# Patient Record
Sex: MALE | Race: BLACK OR AFRICAN AMERICAN | NOT HISPANIC OR LATINO | Employment: OTHER | URBAN - METROPOLITAN AREA
[De-identification: names, ages, dates, MRNs, and addresses within clinical notes are randomized per-mention and may not be internally consistent; named-entity substitution may affect disease eponyms.]

---

## 2017-03-28 ENCOUNTER — OFFICE VISIT (OUTPATIENT)
Dept: NEUROLOGY | Facility: CLINIC | Age: 52
End: 2017-03-28

## 2017-03-28 VITALS
BODY MASS INDEX: 34.21 KG/M2 | HEIGHT: 67 IN | SYSTOLIC BLOOD PRESSURE: 149 MMHG | HEART RATE: 82 BPM | DIASTOLIC BLOOD PRESSURE: 86 MMHG | WEIGHT: 218 LBS

## 2017-03-28 DIAGNOSIS — G40.019 LOCALIZATION-RELATED EPILEPSY, INTRACTABLE (H): Primary | ICD-10-CM

## 2017-03-28 LAB
AST SERPL W P-5'-P-CCNC: 6 U/L (ref 0–45)
PHENYTOIN SERPL-MCNC: 16.9 MG/L (ref 10–20)
SODIUM SERPL-SCNC: 144 MMOL/L (ref 133–144)

## 2017-03-28 RX ORDER — CARBAMAZEPINE 200 MG/1
TABLET, EXTENDED RELEASE ORAL
Qty: 150 TABLET | Refills: 9 | Status: SHIPPED | OUTPATIENT
Start: 2017-03-28 | End: 2017-05-01

## 2017-03-28 RX ORDER — PHENYTOIN SODIUM 100 MG/1
CAPSULE, EXTENDED RELEASE ORAL
Qty: 120 CAPSULE | Refills: 9 | Status: SHIPPED | OUTPATIENT
Start: 2017-03-28

## 2017-03-28 RX ORDER — EXTENDED PHENYTOIN SODIUM 30 MG/1
CAPSULE ORAL
Qty: 30 CAPSULE | Refills: 9 | Status: SHIPPED | OUTPATIENT
Start: 2017-03-28

## 2017-03-28 NOTE — LETTER
3/28/2017     RE: Hernandez Melara  : 1965   MRN: [id number]      Dear Colleague,    Thank you for referring your patient, Hernandez Melara, to the [facility] EPILEPSY CARE at [facility]. Please see a copy of my visit note below.    [facility] [place] [facility]      Patient:  Hernandez Melara  :  1965   Age:  51 year old   Today's Office Visit:  3/28/2017    Epilepsy Data:    Patient History: Hernandez had a severe illness at age 2.5 which put him in a coma. Subsequently he developed mild left hemiparesis, left hemiatrophy and seizures. Clinical history is most suggestive of Localization-related epilepsy with complex partial seizures. An MRI with epilepsy protocol and a 3-hour EEG recommended but patient refuses further testing.    Primary Epileptologist/Provider: Bonnie BALDERRAMA  Epilepsy Syndrome: Localization-related epilepsy unspecified  Age of Onset: 2  Other Relevant Dx/ Issues: He is a twin, severe illness with coma age 1, with subsequent left-sided hemiparesis, severe hypoglycemia age 2, febrile szs ages 3-5     Tests/Surgery History  Last EEG:  (Patient refuse to do EEG)  Last MRI:  (Patient refuse to have MRI)    Seizure Record  Current Visit Date: 17  Previous Visit Date: 16  Months since last visit: 10.12  Seizure Type 1: Complex partial seizures unspecified  # of Type 1 Seizure since last visit: 0  Freq. Type 1 / Month: 0    History of Present Illness:   No seizures this interval. Last seizure was 2016. No concerns today.       Current Outpatient Prescriptions   Medication Sig Dispense Refill     carBAMazepine (TEGRETOL XR) 200 MG 12 hr tablet TAKE 2 TABLETS BY MOUTH EVERY MORNING AND 3 TABLETS EVERY EVENING 150 tablet 9     DILANTIN 100 MG ER capsule Brand: 2 cap po bid (together with 30 mg cap) 120 capsule 9     DILANTIN 30 MG ER capsule Brand. TAKE 1 CAPSULE BY MOUTH EVERY NIGHT AT BEDTIME WITH  MG CAPSULES 30 capsule 9      "rosuvastatin (CRESTOR) 10 MG tablet Take by mouth daily       Calcium Carbonate-Vitamin D (CALCIUM + D PO) Take  by mouth.       Simvastatin (ZOCOR PO) Take  by mouth daily.       Multiple Vitamin (MULTIVITAMINS PO) Take  by mouth daily.       [DISCONTINUED] Phenytoin (DILANTIN PO) Take 30 mg by mouth. 1 tab in the pm          Medication Notes:     Ref. Range 5/24/2016 11:00   Sodium Latest Ref Range: 133 - 144 mmol/L 142      Ref. Range 5/24/2016 11:00   Carbamazepine Total Level Unknown 5.8   10, 11 Epoxide Level Unknown 1.4   Phenytoin Level Latest Ref Range: 10 - 20 mg/L 20.1 (H)   Phenytoin Free Unknown 1.48     AED Side Effects Discussed: Yes   AED Medication Compliance:  compliant most of the time    Review of Systems:  Lethargy / Tiredness: No  Nausea / Vomiting: No  Double Vision: No  Sleepiness: No  Depression: No  Poor Balance: baseline  Dizziness: No  Appetite Changes: No  Blurred Vision: No  Sleep Changes: No  Behavioral Changes: No    Have you experienced a traumatic fall since your last visit: NO  Are these falls related to your seizures: Not Applicable    Other Issues:    Not working, on disability     Exam:    /86  Pulse 82  Ht 5' 7.01\" (170.2 cm)  Wt 218 lb (98.9 kg)  BMI 34.14 kg/m2     Wt Readings from Last 5 Encounters:   03/28/17 218 lb (98.9 kg)   05/24/16 206 lb 3.2 oz (93.5 kg)   07/28/15 214 lb 3.2 oz (97.2 kg)   10/27/14 221 lb (100.2 kg)   01/09/14 227 lb (103 kg)     General Appearance: alert/awake, NAD, weight gain of 12 lbs   Gait: Mild left hemiparetic gait, unable to do tandem gait  Language/speech: no aphasia or dysarthria  Extraocular Movements: Intact, no nystagmus  Coordination: normal FNF on the right. On the left is clumsy due to hemiparesis  Facial Strength: Face is symmetric  Tongue Strength: tongue is midline  Limb Strength: 5/5 on the right. 4+/5 on the left upper and lower extremities. Left hemiatrophy   Tone: increased in left upper and lower " extremities.    Assessment and Plan:   1. Symptomatic localization-related epilepsy (based on clinical history of left hemiparesis, which suggests right hemispheric insult). The patient has refused EEG and MRI in past. Well controlled on current doses.     Patient Instructions   Continue antiepileptic drugs as before  Call if seizures or concerns  Follow-up in 9 months     As described above, I met with the patient for 15 minutes and during this time counseling was less than 50% of the visit time.    Again, thank you for allowing me to participate in the care of your patient.      Sincerely,  TODD Mar

## 2017-03-28 NOTE — PROGRESS NOTES
[facility] [place] [facility]      Patient:  Hernandez Melara  :  1965   Age:  51 year old   Today's Office Visit:  3/28/2017    Epilepsy Data:    Patient History: Hernandez had a severe illness at age 2.5 which put him in a coma. Subsequently he developed mild left hemiparesis, left hemiatrophy and seizures. Clinical history is most suggestive of Localization-related epilepsy with complex partial seizures. An MRI with epilepsy protocol and a 3-hour EEG recommended but patient refuses further testing.    Primary Epileptologist/Provider: Bonnie BALDERRAMA  Epilepsy Syndrome: Localization-related epilepsy unspecified  Age of Onset: 2  Other Relevant Dx/ Issues: He is a twin, severe illness with coma age 1, with subsequent left-sided hemiparesis, severe hypoglycemia age 2, febrile szs ages 3-5     Tests/Surgery History  Last EEG:  (Patient refuse to do EEG)  Last MRI:  (Patient refuse to have MRI)    Seizure Record  Current Visit Date: 17  Previous Visit Date: 16  Months since last visit: 10.12  Seizure Type 1: Complex partial seizures unspecified  # of Type 1 Seizure since last visit: 0  Freq. Type 1 / Month: 0    History of Present Illness:   No seizures this interval. Last seizure was 2016. No concerns today.       Current Outpatient Prescriptions   Medication Sig Dispense Refill     carBAMazepine (TEGRETOL XR) 200 MG 12 hr tablet TAKE 2 TABLETS BY MOUTH EVERY MORNING AND 3 TABLETS EVERY EVENING 150 tablet 9     DILANTIN 100 MG ER capsule Brand: 2 cap po bid (together with 30 mg cap) 120 capsule 9     DILANTIN 30 MG ER capsule Brand. TAKE 1 CAPSULE BY MOUTH EVERY NIGHT AT BEDTIME WITH  MG CAPSULES 30 capsule 9     rosuvastatin (CRESTOR) 10 MG tablet Take by mouth daily       Calcium Carbonate-Vitamin D (CALCIUM + D PO) Take  by mouth.       Simvastatin (ZOCOR PO) Take  by mouth daily.       Multiple Vitamin (MULTIVITAMINS PO) Take  by mouth daily.       [DISCONTINUED] Phenytoin  "(DILANTIN PO) Take 30 mg by mouth. 1 tab in the pm          Medication Notes:     Ref. Range 5/24/2016 11:00   Sodium Latest Ref Range: 133 - 144 mmol/L 142      Ref. Range 5/24/2016 11:00   Carbamazepine Total Level Unknown 5.8   10, 11 Epoxide Level Unknown 1.4   Phenytoin Level Latest Ref Range: 10 - 20 mg/L 20.1 (H)   Phenytoin Free Unknown 1.48     AED Side Effects Discussed: Yes   AED Medication Compliance:  compliant most of the time    Review of Systems:  Lethargy / Tiredness: No  Nausea / Vomiting: No  Double Vision: No  Sleepiness: No  Depression: No  Poor Balance: baseline  Dizziness: No  Appetite Changes: No  Blurred Vision: No  Sleep Changes: No  Behavioral Changes: No    Have you experienced a traumatic fall since your last visit: NO  Are these falls related to your seizures: Not Applicable    Other Issues:    Not working, on disability     Exam:    /86  Pulse 82  Ht 5' 7.01\" (170.2 cm)  Wt 218 lb (98.9 kg)  BMI 34.14 kg/m2     Wt Readings from Last 5 Encounters:   03/28/17 218 lb (98.9 kg)   05/24/16 206 lb 3.2 oz (93.5 kg)   07/28/15 214 lb 3.2 oz (97.2 kg)   10/27/14 221 lb (100.2 kg)   01/09/14 227 lb (103 kg)     General Appearance: alert/awake, NAD, weight gain of 12 lbs   Gait: Mild left hemiparetic gait, unable to do tandem gait  Language/speech: no aphasia or dysarthria  Extraocular Movements: Intact, no nystagmus  Coordination: normal FNF on the right. On the left is clumsy due to hemiparesis  Facial Strength: Face is symmetric  Tongue Strength: tongue is midline  Limb Strength: 5/5 on the right. 4+/5 on the left upper and lower extremities. Left hemiatrophy   Tone: increased in left upper and lower extremities.    Assessment and Plan:   1. Symptomatic localization-related epilepsy (based on clinical history of left hemiparesis, which suggests right hemispheric insult). The patient has refused EEG and MRI in past. Well controlled on current doses.     Patient Instructions   Continue " antiepileptic drugs as before  Call if seizures or concerns  Follow-up in 9 months     As described above, I met with the patient for 15 minutes and during this time counseling was less than 50% of the visit time.

## 2017-03-28 NOTE — MR AVS SNAPSHOT
After Visit Summary   3/28/2017    Hernandez Melara    MRN: [id number]           Patient Information     Date Of Birth          1965        Visit Information        Provider Department      3/28/2017 11:30 AM Bonnie Epstein PA [facility]        Today's Diagnoses     Localization-related epilepsy, intractable (H)    -  1      Care Instructions    Continue antiepileptic drugs as before  Call if seizures or concerns  Follow-up in 9 months         Follow-ups after your visit        Follow-up notes from your care team     Return in about 9 months (around 2017).      Who to contact     Please call your clinic at [phone] to:    Ask questions about your health    Make or cancel appointments    Discuss your medicines    Learn about your test results    Speak to your doctor   If you have compliments or concerns about an experience at your clinic, or if you wish to file a complaint, please contact [facility] Patient Relations at [phone] or email us at [email or web address]         Additional Information About Your Visit        MyChart Information     [employer] is an electronic gateway that provides easy, online access to your medical records. With [employer], you can request a clinic appointment, read your test results, renew a prescription or communicate with your care team.     To sign up for [employer] visit the website at [email or web address]   You will be asked to enter the access code listed below, as well as some personal information. Please follow the directions to create your username and password.     Your access code is: [email or web address]  Expires: 2017  1:53 PM     Your access code will  in 90 days. If you need help or a new code, please contact your [facility] or call [phone] for assistance.        Care EveryWhere ID     This is your Care EveryWhere ID. This could be used by other  "organizations to access your [facility] medical records  [id number]        Your Vitals Were     Pulse Height BMI (Body Mass Index)             82 5' 7.01\" (170.2 cm) 34.14 kg/m2          Blood Pressure from Last 3 Encounters:   03/28/17 149/86   05/24/16 144/78   07/28/15 145/79    Weight from Last 3 Encounters:   03/28/17 218 lb (98.9 kg)   05/24/16 206 lb 3.2 oz (93.5 kg)   07/28/15 214 lb 3.2 oz (97.2 kg)              We Performed the Following     AST (SGOT)     Carbamazepine and 10 11 epoxide     Phenytoin free     Phenytoin level     Sodium          Where to get your medicines      These medications were sent to [facility] [address] [address] [address]     Phone:  [phone]     carBAMazepine 200 MG 12 hr tablet    DILANTIN 100 MG CR capsule    DILANTIN 30 MG CR capsule          Primary Care Provider Office Phone # Fax #    Scooter Perkins [phone]       [facility] [address] [facility] [address]        Thank you!     Thank you for choosing [facility] EPILEPSY CARE  for your care. Our goal is always to provide you with excellent care. Hearing back from our patients is one way we can continue to improve our services. Please take a few minutes to complete the written survey that you may receive in the mail after your visit with us. Thank you!             Your Updated Medication List - Protect others around you: Learn how to safely use, store and throw away your medicines at www.disposemymeds.org.          This list is accurate as of: 3/28/17  3:47 PM.  Always use your most recent med list.                   Brand Name Dispense Instructions for use    CALCIUM + D PO      Take  by mouth.       carBAMazepine 200 MG 12 hr tablet    TEGretol XR    150 tablet    TAKE 2 TABLETS BY MOUTH EVERY MORNING AND 3 TABLETS EVERY EVENING       CRESTOR 10 MG tablet   Generic drug:  rosuvastatin      Take by mouth " daily       * DILANTIN 100 MG CR capsule   Generic drug:  phenytoin     120 capsule    Brand: 2 cap po bid (together with 30 mg cap)       * DILANTIN 30 MG CR capsule   Generic drug:  phenytoin     30 capsule    Brand. TAKE 1 CAPSULE BY MOUTH EVERY NIGHT AT BEDTIME WITH  MG CAPSULES       MULTIVITAMINS PO      Take  by mouth daily.       ZOCOR PO      Take  by mouth daily.       * Notice:  This list has 2 medication(s) that are the same as other medications prescribed for you. Read the directions carefully, and ask your doctor or other care provider to review them with you.

## 2017-03-28 NOTE — LETTER
Patient:  Hernandez Melara  :   1965  MRN:     [id number]        Mr.Rodney ETHAN Melara  [address]   [place] [facility] [place] [facility] [address]        2017    Dear ,    We are writing to inform you of your test results.    Your test results fall within the expected range(s) or remain unchanged from previous results.  Please continue with current treatment plan.    Resulted Orders   Carbamazepine and 10 11 epoxide   Result Value Ref Range    Carbamazepine Total Level 4.8       Comment:      Reference range: 4.0  to  12.0  Unit: ug/mL      10, 11 Epoxide Level 1.1       Comment:      Reference range: 0.4  to  4.0  Unit: ug/mL  (Note)  This test was developed and its performance characteristics  determined by [facility].  It has not been cleared or approved  by the Food and Drug Administration.  Analysis performed by [facility], [address] [place] [address]     Phenytoin level   Result Value Ref Range    Phenytoin Level 16.9 10 - 20 mg/L   Phenytoin free   Result Value Ref Range    Phenytoin Free 1.24       Comment:      Analysis performed by [facility], [address] [place] [address]  Reference range: 1.00  to  2.00  Unit: ug/ml     AST (SGOT)   Result Value Ref Range    AST 6 0 - 45 U/L   Sodium   Result Value Ref Range    Sodium 144 133 - 144 mmol/L       Bonnie Epstein PA-C              [phone]  1965

## 2017-03-30 LAB
CARBAMAZEPINE EP SERPL-MCNC: 1.1 UG/ML
CARBAMAZEPINE SERPL-MCNC: 4.8 UG/ML
PHENYTOIN FREE SERPL-MCNC: 1.24 UG/ML

## 2017-05-01 ENCOUNTER — TELEPHONE (OUTPATIENT)
Dept: NEUROLOGY | Facility: CLINIC | Age: 52
End: 2017-05-01

## 2017-05-01 DIAGNOSIS — G40.019 LOCALIZATION-RELATED EPILEPSY, INTRACTABLE (H): ICD-10-CM

## 2017-05-01 RX ORDER — CARBAMAZEPINE 200 MG/1
TABLET, EXTENDED RELEASE ORAL
Qty: 450 TABLET | Refills: 1 | Status: SHIPPED | OUTPATIENT
Start: 2017-05-01 | End: 2017-11-10

## 2017-11-10 DIAGNOSIS — G40.019 LOCALIZATION-RELATED EPILEPSY, INTRACTABLE (H): ICD-10-CM

## 2017-11-10 RX ORDER — CARBAMAZEPINE 200 MG/1
TABLET, EXTENDED RELEASE ORAL
Qty: 450 TABLET | Refills: 0 | Status: SHIPPED | OUTPATIENT
Start: 2017-11-10

## 2018-02-06 DIAGNOSIS — G40.019 LOCALIZATION-RELATED EPILEPSY, INTRACTABLE (H): ICD-10-CM

## 2018-02-06 RX ORDER — CARBAMAZEPINE 200 MG/1
TABLET, EXTENDED RELEASE ORAL
Qty: 450 TABLET | Refills: 0 | OUTPATIENT
Start: 2018-02-06

## 2018-02-08 ENCOUNTER — TELEPHONE (OUTPATIENT)
Dept: NEUROLOGY | Facility: CLINIC | Age: 53
End: 2018-02-08

## 2018-02-08 DIAGNOSIS — G40.019 LOCALIZATION-RELATED EPILEPSY, INTRACTABLE (H): ICD-10-CM

## 2018-02-08 DIAGNOSIS — Z53.9 ERRONEOUS ENCOUNTER--DISREGARD: Primary | ICD-10-CM

## 2018-02-08 RX ORDER — PHENYTOIN SODIUM 100 MG/1
CAPSULE, EXTENDED RELEASE ORAL
Qty: 120 CAPSULE | Refills: 0 | OUTPATIENT
Start: 2018-02-08

## 2018-02-08 RX ORDER — CARBAMAZEPINE 200 MG/1
TABLET, EXTENDED RELEASE ORAL
Qty: 450 TABLET | Refills: 0 | OUTPATIENT
Start: 2018-02-08

## 2018-02-08 RX ORDER — EXTENDED PHENYTOIN SODIUM 30 MG/1
CAPSULE ORAL
Qty: 30 CAPSULE | Refills: 0 | OUTPATIENT
Start: 2018-02-08

## 2018-04-16 DIAGNOSIS — G40.019 LOCALIZATION-RELATED EPILEPSY, INTRACTABLE (H): Primary | ICD-10-CM

## 2018-04-16 RX ORDER — EXTENDED PHENYTOIN SODIUM 30 MG/1
CAPSULE ORAL
Qty: 14 CAPSULE | Refills: 0 | Status: SHIPPED | OUTPATIENT
Start: 2018-04-16

## 2018-04-16 RX ORDER — PHENYTOIN SODIUM 100 MG/1
CAPSULE, EXTENDED RELEASE ORAL
Qty: 56 CAPSULE | Refills: 0 | Status: SHIPPED | OUTPATIENT
Start: 2018-04-16 | End: 2019-09-03

## 2018-08-07 DIAGNOSIS — G40.019 LOCALIZATION-RELATED EPILEPSY, INTRACTABLE (H): ICD-10-CM

## 2018-08-07 RX ORDER — PHENYTOIN SODIUM 100 MG/1
CAPSULE, EXTENDED RELEASE ORAL
Qty: 56 CAPSULE | Refills: 0 | Status: CANCELLED | OUTPATIENT
Start: 2018-08-07

## 2018-08-09 ENCOUNTER — TELEPHONE (OUTPATIENT)
Dept: NEUROLOGY | Facility: CLINIC | Age: 53
End: 2018-08-09

## 2018-08-09 DIAGNOSIS — G40.019 LOCALIZATION-RELATED EPILEPSY, INTRACTABLE (H): ICD-10-CM

## 2018-08-09 RX ORDER — PHENYTOIN SODIUM 100 MG/1
CAPSULE, EXTENDED RELEASE ORAL
Qty: 56 CAPSULE | Refills: 0 | OUTPATIENT
Start: 2018-08-09

## 2018-08-09 NOTE — TELEPHONE ENCOUNTER
Nurse received refill request for: Dilantin 100 mg     Last re-ordered: 4/16/18    Last refill: 7/11/18    Last appointment: 3/28/17    Next appointment:  None scheduled    From last clinic notes:    Pharmacy: [facility] # [id number]    Action taken: Refused - needs to follow up

## 2019-01-18 DIAGNOSIS — G40.019 LOCALIZATION-RELATED EPILEPSY, INTRACTABLE (H): ICD-10-CM

## 2019-01-18 RX ORDER — PHENYTOIN SODIUM 100 MG/1
CAPSULE, EXTENDED RELEASE ORAL
Qty: 56 CAPSULE | Refills: 0 | OUTPATIENT
Start: 2019-01-18

## 2019-01-18 NOTE — TELEPHONE ENCOUNTER
Refill request received. Patient hasn't been to the clinic for >18 months. Charting reflects that the patient is no longer following with our office.

## 2019-09-03 DIAGNOSIS — G40.019 LOCALIZATION-RELATED EPILEPSY, INTRACTABLE (H): Primary | ICD-10-CM

## 2019-09-03 RX ORDER — PHENYTOIN SODIUM 100 MG/1
CAPSULE, EXTENDED RELEASE ORAL
Qty: 56 CAPSULE | Refills: 0 | Status: SHIPPED | OUTPATIENT
Start: 2019-09-03

## 2019-09-27 DIAGNOSIS — G40.019 LOCALIZATION-RELATED EPILEPSY, INTRACTABLE (H): ICD-10-CM

## 2019-09-30 RX ORDER — PHENYTOIN SODIUM 100 MG/1
CAPSULE, EXTENDED RELEASE ORAL
Qty: 56 CAPSULE | Refills: 0 | OUTPATIENT
Start: 2019-09-30

## 2019-09-30 NOTE — TELEPHONE ENCOUNTER
Patient has not been seen for >2 years.  Refill refused. Patient should follow with the provider who has been managing medications

## 2019-11-12 DIAGNOSIS — G40.019 LOCALIZATION-RELATED EPILEPSY, INTRACTABLE (H): ICD-10-CM

## 2019-11-12 RX ORDER — PHENYTOIN SODIUM 100 MG/1
CAPSULE, EXTENDED RELEASE ORAL
Qty: 56 CAPSULE | Refills: 0 | OUTPATIENT
Start: 2019-11-12

## 2019-12-23 DIAGNOSIS — G40.019 LOCALIZATION-RELATED EPILEPSY, INTRACTABLE (H): ICD-10-CM

## 2019-12-27 RX ORDER — PHENYTOIN SODIUM 100 MG/1
CAPSULE, EXTENDED RELEASE ORAL
Qty: 56 CAPSULE | Refills: 0 | OUTPATIENT
Start: 2019-12-27

## 2019-12-27 NOTE — TELEPHONE ENCOUNTER
Incoming refill request from Maribel Chu79 via Interface.    Medication requested: Dilantin 100 mg capsule  Directions: Take 2 capsules by mouth twice daily  Last refilled: 9/3/19    Last seen: 3/28/17  RTC: 9 months  Cancel: one visit  No-show: one visit  Next appt:  Not scheduled    Refill request refused as patient is no longer under the care of our office.

## 2021-05-02 NOTE — NURSING NOTE
Medication taken at: 9 am  Blood drawn at: 11:41 am  Marisa Singh CMA        
self inflicted burn to left wrist

## 2022-03-02 ENCOUNTER — HOSPITAL ENCOUNTER (EMERGENCY)
Facility: CLINIC | Age: 57
Discharge: HOME | End: 2022-03-02
Attending: EMERGENCY MEDICINE | Admitting: EMERGENCY MEDICINE
Payer: MEDICARE

## 2022-03-02 VITALS
BODY MASS INDEX: 29.91 KG/M2 | RESPIRATION RATE: 18 BRPM | SYSTOLIC BLOOD PRESSURE: 140 MMHG | HEART RATE: 70 BPM | DIASTOLIC BLOOD PRESSURE: 92 MMHG | WEIGHT: 191 LBS | TEMPERATURE: 97.9 F | OXYGEN SATURATION: 100 %

## 2022-03-02 DIAGNOSIS — G40.919 BREAKTHROUGH SEIZURE (H): ICD-10-CM

## 2022-03-02 LAB
ANION GAP SERPL CALCULATED.3IONS-SCNC: 5 MMOL/L (ref 3–14)
BASOPHILS # BLD AUTO: 0 10E3/UL (ref 0–0.2)
BASOPHILS NFR BLD AUTO: 0 %
BUN SERPL-MCNC: 11 MG/DL (ref 7–30)
CALCIUM SERPL-MCNC: 8.8 MG/DL (ref 8.5–10.1)
CARBAMAZEPINE SERPL-MCNC: 6.8 MG/L
CHLORIDE BLD-SCNC: 109 MMOL/L (ref 94–109)
CO2 SERPL-SCNC: 27 MMOL/L (ref 20–32)
CREAT SERPL-MCNC: 0.82 MG/DL (ref 0.66–1.25)
EOSINOPHIL # BLD AUTO: 0.1 10E3/UL (ref 0–0.7)
EOSINOPHIL NFR BLD AUTO: 1 %
ERYTHROCYTE [DISTWIDTH] IN BLOOD BY AUTOMATED COUNT: 12.1 % (ref 10–15)
GFR SERPL CREATININE-BSD FRML MDRD: >90 ML/MIN/1.73M2
GLUCOSE BLD-MCNC: 110 MG/DL (ref 70–99)
HCT VFR BLD AUTO: 41.1 % (ref 40–53)
HGB BLD-MCNC: 13.4 G/DL (ref 13.3–17.7)
IMM GRANULOCYTES # BLD: 0 10E3/UL
IMM GRANULOCYTES NFR BLD: 0 %
LYMPHOCYTES # BLD AUTO: 1.7 10E3/UL (ref 0.8–5.3)
LYMPHOCYTES NFR BLD AUTO: 22 %
MCH RBC QN AUTO: 28.9 PG (ref 26.5–33)
MCHC RBC AUTO-ENTMCNC: 32.6 G/DL (ref 31.5–36.5)
MCV RBC AUTO: 89 FL (ref 78–100)
MONOCYTES # BLD AUTO: 0.7 10E3/UL (ref 0–1.3)
MONOCYTES NFR BLD AUTO: 8 %
NEUTROPHILS # BLD AUTO: 5.4 10E3/UL (ref 1.6–8.3)
NEUTROPHILS NFR BLD AUTO: 69 %
NRBC # BLD AUTO: 0 10E3/UL
NRBC BLD AUTO-RTO: 0 /100
PHENYTOIN SERPL-MCNC: 16.4 MG/L
PLATELET # BLD AUTO: 244 10E3/UL (ref 150–450)
POTASSIUM BLD-SCNC: 3.4 MMOL/L (ref 3.4–5.3)
RBC # BLD AUTO: 4.64 10E6/UL (ref 4.4–5.9)
SODIUM SERPL-SCNC: 141 MMOL/L (ref 133–144)
WBC # BLD AUTO: 7.8 10E3/UL (ref 4–11)

## 2022-03-02 PROCEDURE — 85025 COMPLETE CBC W/AUTO DIFF WBC: CPT | Performed by: EMERGENCY MEDICINE

## 2022-03-02 PROCEDURE — 99283 EMERGENCY DEPT VISIT LOW MDM: CPT | Performed by: EMERGENCY MEDICINE

## 2022-03-02 PROCEDURE — 80048 BASIC METABOLIC PNL TOTAL CA: CPT | Performed by: EMERGENCY MEDICINE

## 2022-03-02 PROCEDURE — 36415 COLL VENOUS BLD VENIPUNCTURE: CPT | Performed by: EMERGENCY MEDICINE

## 2022-03-02 PROCEDURE — 80185 ASSAY OF PHENYTOIN TOTAL: CPT | Performed by: EMERGENCY MEDICINE

## 2022-03-02 PROCEDURE — 80156 ASSAY CARBAMAZEPINE TOTAL: CPT | Performed by: EMERGENCY MEDICINE

## 2022-03-02 RX ORDER — ATORVASTATIN CALCIUM 20 MG/1
20 TABLET, FILM COATED ORAL DAILY
COMMUNITY

## 2022-03-02 ASSESSMENT — ENCOUNTER SYMPTOMS
FACIAL ASYMMETRY: 0
SEIZURES: 1
SHORTNESS OF BREATH: 0
HEADACHES: 0
TREMORS: 0
WEAKNESS: 1
FEVER: 0
DIZZINESS: 0
ABDOMINAL PAIN: 0
SPEECH DIFFICULTY: 0

## 2022-03-03 NOTE — ED TRIAGE NOTES
"Patient reports having two seizures today, states the first lasted approximately an hour and the other lasted \"minutes\". Unwitnessed seizures. Patient states that he did not fall, as he was lying down watching TV when the seizures occurred. Alert and oriented upon arrival to ED. States he has been taking antiepileptic medications as prescribed. Renate Valdez RN on 3/2/2022 at 10:05 PM    "

## 2022-03-03 NOTE — DISCHARGE INSTRUCTIONS
All of your blood tests are normal  The level of your antiseizure medications is appropriate/therapeutic.  Contact your epilepsy doctor tomorrow

## 2022-03-03 NOTE — ED PROVIDER NOTES
[facility] EMERGENCY DEPARTMENT ([facility])  3/02/22    History     Chief Complaint   Patient presents with     Seizures     reports has had three seizures since 1900 tonight, reports takes medications for seizures and has not had any recent medication changes, denies any head trauma     HPI  Hernandez Melara is a 56 year old male with PMH significant for epilepsy with complex partial seizures who presents to the Emergency Department for evaluation of seizures.  Patient reports that he had 3 seizures earlier today, all of them unwitnessed while he was home alone in his apartment.  He denies missing any doses of his seizure medication (Tegretol and Dilantin).  Patient sister states that he has a long history of seizures, but the last time that she can remember him having one was approximately a year ago.  The patient indicates that he is able to tell afterwards when he had a seizure, but is unable to recall when his last one was before today.  He states he has not had a seizure for at least a week before today.  Patient sister indicates that his seizures are typically complex partial in nature.  She also states he has a history of hypoglycemia.      Past Medical History  History reviewed. No pertinent past medical history.  History reviewed. No pertinent surgical history.  atorvastatin (LIPITOR) 20 MG tablet  carBAMazepine (TEGRETOL XR) 200 MG 12 hr tablet  DILANTIN 100 MG capsule  DILANTIN 30 MG CR capsule  Calcium Carbonate-Vitamin D (CALCIUM + D PO)  DILANTIN 100 MG CR capsule  DILANTIN 30 MG CR capsule  Multiple Vitamin (MULTIVITAMINS PO)  rosuvastatin (CRESTOR) 10 MG tablet  Simvastatin (ZOCOR PO)      Allergies   Allergen Reactions     Aspirin Itching     Past medical history, past surgical history, medications, and allergies were reviewed with the patient. Additional pertinent items: None    Family History  History reviewed. No pertinent family history.  Family history was reviewed with the patient.  Additional pertinent items: None    Social History  Social History     Tobacco Use     Smoking status: Former Smoker     Packs/day: 1.00     Types: Cigarettes     Quit date: 2008     Years since quittin.6     Smokeless tobacco: Never Used   Substance Use Topics     Alcohol use: Never     Drug use: None      Social history was reviewed with the patient. Additional pertinent items: None      Review of Systems   Constitutional: Negative for fever.   HENT: Negative for congestion.    Respiratory: Negative for shortness of breath.    Cardiovascular: Negative for chest pain.   Gastrointestinal: Negative for abdominal pain.   Musculoskeletal: Negative for gait problem.   Neurological: Positive for seizures and weakness (chronic left arm). Negative for dizziness, tremors, facial asymmetry, speech difficulty and headaches.   All other systems reviewed and are negative.    A complete review of systems was performed with pertinent positives and negatives noted in the HPI, and all other systems negative.    Physical Exam   BP: (!) 160/92  Pulse: 96  Temp: 99  F (37.2  C)  Resp: 16  Weight: 86.6 kg (191 lb)  SpO2: 99 %  Physical Exam  Vitals and nursing note reviewed.   Constitutional:       General: He is not in acute distress.     Appearance: He is well-developed.   HENT:      Head: Normocephalic and atraumatic.      Mouth/Throat:      Mouth: Mucous membranes are moist.   Eyes:      General: No scleral icterus.     Conjunctiva/sclera: Conjunctivae normal.      Pupils: Pupils are equal, round, and reactive to light.   Cardiovascular:      Rate and Rhythm: Normal rate and regular rhythm.      Heart sounds: Normal heart sounds.   Pulmonary:      Effort: Pulmonary effort is normal. No respiratory distress.      Breath sounds: Normal breath sounds. No wheezing.   Abdominal:      General: Abdomen is flat.      Palpations: Abdomen is soft.   Musculoskeletal:      Cervical back: Neck supple.   Skin:     General: Skin is warm  and dry.   Neurological:      General: No focal deficit present.      Mental Status: He is alert and oriented to person, place, and time.      Cranial Nerves: No cranial nerve deficit.      Comments: Chronic left arm weakness since childhood   Psychiatric:         Mood and Affect: Mood normal.         Behavior: Behavior normal.         ED Course      Procedures   10:14 PM  The patient was seen and examined by Homar Rush MD in Room ED18.                 Results for orders placed or performed during the hospital encounter of 03/02/22   Basic metabolic panel     Status: Abnormal   Result Value Ref Range    Sodium 141 133 - 144 mmol/L    Potassium 3.4 3.4 - 5.3 mmol/L    Chloride 109 94 - 109 mmol/L    Carbon Dioxide (CO2) 27 20 - 32 mmol/L    Anion Gap 5 3 - 14 mmol/L    Urea Nitrogen 11 7 - 30 mg/dL    Creatinine 0.82 0.66 - 1.25 mg/dL    Calcium 8.8 8.5 - 10.1 mg/dL    Glucose 110 (H) 70 - 99 mg/dL    GFR Estimate >90 >60 mL/min/1.73m2   Phenytoin level     Status: Normal   Result Value Ref Range    Phenytoin 16.4   mg/L   Carbamazepine total     Status: Normal   Result Value Ref Range    Carbamazepine 6.8   mg/L   CBC with platelets and differential     Status: None   Result Value Ref Range    WBC Count 7.8 4.0 - 11.0 10e3/uL    RBC Count 4.64 4.40 - 5.90 10e6/uL    Hemoglobin 13.4 13.3 - 17.7 g/dL    Hematocrit 41.1 40.0 - 53.0 %    MCV 89 78 - 100 fL    MCH 28.9 26.5 - 33.0 pg    MCHC 32.6 31.5 - 36.5 g/dL    RDW 12.1 10.0 - 15.0 %    Platelet Count 244 150 - 450 10e3/uL    % Neutrophils 69 %    % Lymphocytes 22 %    % Monocytes 8 %    % Eosinophils 1 %    % Basophils 0 %    % Immature Granulocytes 0 %    NRBCs per 100 WBC 0 <1 /100    Absolute Neutrophils 5.4 1.6 - 8.3 10e3/uL    Absolute Lymphocytes 1.7 0.8 - 5.3 10e3/uL    Absolute Monocytes 0.7 0.0 - 1.3 10e3/uL    Absolute Eosinophils 0.1 0.0 - 0.7 10e3/uL    Absolute Basophils 0.0 0.0 - 0.2 10e3/uL    Absolute Immature Granulocytes 0.0 <=0.4  10e3/uL    Absolute NRBCs 0.0 10e3/uL   CBC with platelets differential     Status: None    Narrative    The following orders were created for panel order CBC with platelets differential.  Procedure                               Abnormality         Status                     ---------                               -----------         ------                     CBC with platelets and d...[[id number]]                      Final result                 Please view results for these tests on the individual orders.     Medications - No data to display     Assessments & Plan (with Medical Decision Making)   Patient with history of complex partial seizures currently on Tegretol and Dilantin reportedly had 3 breakthrough seizures today they were unwitnessed he has no stigmata of seizure he is not postictal there is no incontinence or tongue biting.  His Tegretol and Dilantin level are therapeutic.  I recommend they contact the epilepsy provider tomorrow.  I do not see that he needs to be admitted I do not see any evidence for infection any likely is medication compliant he has normal vital signs and his neurologic examination is at baseline.    I have reviewed the nursing notes. I have reviewed the findings, diagnosis, plan and need for follow up with the patient.    New Prescriptions    No medications on file       Final diagnoses:   Breakthrough seizure (H)     I, Jean Marie Briscoe, am serving as a trained medical scribe to document services personally performed by Homar Rush MD, based on the provider's statements to me.     I, Homar Rush MD, was physically present and have reviewed and verified the accuracy of this note documented by Jean Marie Briscoe.    --  Homar Rush MD  [facility] EMERGENCY DEPARTMENT  3/2/2022     Homar Rush MD  03/02/22 3237

## 2024-08-03 ENCOUNTER — HOSPITAL ENCOUNTER (EMERGENCY)
Facility: CLINIC | Age: 59
Discharge: HOME | End: 2024-08-03
Attending: EMERGENCY MEDICINE | Admitting: EMERGENCY MEDICINE
Payer: MEDICARE

## 2024-08-03 VITALS
HEIGHT: 67 IN | DIASTOLIC BLOOD PRESSURE: 88 MMHG | SYSTOLIC BLOOD PRESSURE: 170 MMHG | BODY MASS INDEX: 29.7 KG/M2 | WEIGHT: 189.2 LBS | RESPIRATION RATE: 15 BRPM | TEMPERATURE: 98.5 F | HEART RATE: 89 BPM | OXYGEN SATURATION: 97 %

## 2024-08-03 PROCEDURE — 250N000013 HC RX MED GY IP 250 OP 250 PS 637: Performed by: EMERGENCY MEDICINE

## 2024-08-03 PROCEDURE — 99283 EMERGENCY DEPT VISIT LOW MDM: CPT | Performed by: EMERGENCY MEDICINE

## 2024-08-03 RX ORDER — AMOXICILLIN 500 MG/1
500 CAPSULE ORAL 2 TIMES DAILY
Qty: 14 CAPSULE | Refills: 0 | Status: SHIPPED | OUTPATIENT
Start: 2024-08-03 | End: 2024-08-10

## 2024-08-03 RX ADMIN — DOCUSATE SODIUM 50 MG: 50 LIQUID ORAL at 16:24

## 2024-08-03 ASSESSMENT — ACTIVITIES OF DAILY LIVING (ADL)
ADLS_ACUITY_SCORE: 35
ADLS_ACUITY_SCORE: 35

## 2024-08-03 ASSESSMENT — COLUMBIA-SUICIDE SEVERITY RATING SCALE - C-SSRS
6. HAVE YOU EVER DONE ANYTHING, STARTED TO DO ANYTHING, OR PREPARED TO DO ANYTHING TO END YOUR LIFE?: NO
2. HAVE YOU ACTUALLY HAD ANY THOUGHTS OF KILLING YOURSELF IN THE PAST MONTH?: NO
1. IN THE PAST MONTH, HAVE YOU WISHED YOU WERE DEAD OR WISHED YOU COULD GO TO SLEEP AND NOT WAKE UP?: NO

## 2024-08-03 NOTE — ED PROVIDER NOTES
ED Provider Note  [facility]      History     Chief Complaint   Patient presents with    Otalgia    Cerumen Impaction     HPI  Hernandez Melara is a 58 year old male with a past medical history significant for epilepsy who presents to the Emergency Department for evaluation of cerumen impaction. Patient states last  he noticed some ear wax impaction so he attempted to use a Q-tip to remove this. He believes he pushed his ear wax deeper and his left ear now feels plugged up;. He states he feels as if he is having difficulty hearing from that ear. This has never happened before. He denies pain. No other medical concerns.  No recent illness.  No chest pain or shortness of breath.    Past Medical History  History reviewed. No pertinent past medical history.  History reviewed. No pertinent surgical history.  amoxicillin (AMOXIL) 500 MG capsule  carBAMazepine (TEGRETOL XR) 200 MG 12 hr tablet  DILANTIN 100 MG CR capsule  DILANTIN 30 MG CR capsule  DILANTIN 30 MG CR capsule  atorvastatin (LIPITOR) 20 MG tablet  Calcium Carbonate-Vitamin D (CALCIUM + D PO)  DILANTIN 100 MG capsule  Multiple Vitamin (MULTIVITAMINS PO)  rosuvastatin (CRESTOR) 10 MG tablet  Simvastatin (ZOCOR PO)      Allergies   Allergen Reactions    Aspirin Itching     Family History  History reviewed. No pertinent family history.  Social History   Social History     Tobacco Use    Smoking status: Former     Current packs/day: 0.00     Types: Cigarettes     Quit date: 2008     Years since quittin.0    Smokeless tobacco: Never   Substance Use Topics    Alcohol use: Never      Past medical history, past surgical history, medications, allergies, family history, and social history were reviewed with the patient. No additional pertinent items.   A medically appropriate review of systems was performed with pertinent positives and negatives noted in the HPI, and all other systems negative.    Physical Exam   BP: (!)  "150/100  Pulse: 89  Temp: 98.5  F (36.9  C)  Resp: 14  Height: 170.2 cm (5' 7\")  Weight: 85.8 kg (189 lb 3.2 oz)  SpO2: 98 %  Physical Exam  General: awake, alert, NAD  Head: normal cephalic  HEENT: pupils equal, conjugate gaze intact; patient has cerumen in the left canal, it is not completely blocking the TM but there is a significant amount.  Visualized TM appears erythematous.  After ear canal irrigation there appears to be a effusion behind the left TM.  Right TM is normal.  Neck: Supple  CV: regular rate   Neuro: awake, answers questions appropriately. No focal deficits noted       ED Course, Procedures, & Data      Procedures    No results found for any visits on 08/03/24.  Medications   docusate (COLACE) 50 MG/5ML liquid 50 mg (50 mg Oral $Given 8/3/24 9204)     Labs Ordered and Resulted from Time of ED Arrival to Time of ED Departure - No data to display  No orders to display          Critical care was not performed.     Medical Decision Making  The patient's presentation was of low complexity (an acute and uncomplicated illness or injury).    The patient's evaluation involved:  history and exam without other MDM data elements    The patient's management necessitated moderate risk (prescription drug management including medications given in the ED).    Assessment & Plan    Loco is a 58-year-old male presents to the emergency department with concern for earwax impaction and decreased hearing from his left TM.  He is hypertensive but denies chest pain or shortness of breath.    He did have moderate amount of wax.  This was irrigated.  Once it was irrigated I could see he has an effusion behind that drum.  It was also erythematous felt this could be secondary to irrigation.  Will cover with amoxicillin.  He was given instructions to follow-up with ENT if symptoms do not resolve after of trial of antibiotics.  Patient was noted to be hypertensive.  He should follow-up with PCP.      I have reviewed the nursing " notes. I have reviewed the findings, diagnosis, plan and need for follow up with the patient.    New Prescriptions    AMOXICILLIN (AMOXIL) 500 MG CAPSULE    Take 1 capsule (500 mg) by mouth 2 times daily for 7 days       Final diagnoses:   None   I, ELIE WALSH, am serving as a trained medical scribe to document services personally performed by Feroz Landis MD, based on the provider's statements to me.      IFeroz MD, was physically present and have reviewed and verified the accuracy of this note documented by ELIE WALSH.     Feroz Landis MD  [facility] EMERGENCY DEPARTMENT  8/3/2024           Feroz Landis MD  08/03/24 0043

## 2024-08-03 NOTE — ED TRIAGE NOTES
Pt noticed some ear wax impaction in his ear last Sunday and pain worsened. Pt tried to remove on his own yetserday, but believes he created a worse impaction.  Today pt has no pain.      Triage Assessment (Adult)       Row Name 08/03/24 1543          Triage Assessment    Airway WDL WDL        Respiratory WDL    Respiratory WDL WDL        Skin Circulation/Temperature WDL    Skin Circulation/Temperature WDL WDL        Cardiac WDL    Cardiac WDL WDL        Peripheral/Neurovascular WDL    Peripheral Neurovascular WDL WDL        Cognitive/Neuro/Behavioral WDL    Cognitive/Neuro/Behavioral WDL WDL

## 2024-08-03 NOTE — DISCHARGE INSTRUCTIONS
Your blood pressure is elevated.  Please follow-up with your primary care doctor regarding this.    Will trial antibiotics as I suspect your decreased hearing is from an effusion and ear infection.  If you do not get complete resolution of symptoms please follow-up with ENT at the number below:  Please make an appointment to follow up with [facility] (phone: [phone]) in 7 days unless symptoms completely resolve.    Keep your bug bites clean with soap and water.  Can put bacitracin ointment if needed

## 2025-01-18 ENCOUNTER — APPOINTMENT (OUTPATIENT)
Dept: GENERAL RADIOLOGY | Facility: CLINIC | Age: 60
End: 2025-01-18
Attending: EMERGENCY MEDICINE
Payer: MEDICARE

## 2025-01-18 ENCOUNTER — APPOINTMENT (OUTPATIENT)
Dept: CT IMAGING | Facility: CLINIC | Age: 60
End: 2025-01-18
Attending: EMERGENCY MEDICINE
Payer: MEDICARE

## 2025-01-18 ENCOUNTER — HOSPITAL ENCOUNTER (EMERGENCY)
Facility: CLINIC | Age: 60
Discharge: HOME | End: 2025-01-19
Attending: EMERGENCY MEDICINE | Admitting: EMERGENCY MEDICINE
Payer: MEDICARE

## 2025-01-18 DIAGNOSIS — R42 DIZZINESS: ICD-10-CM

## 2025-01-18 DIAGNOSIS — R03.0 ELEVATED BLOOD PRESSURE READING WITHOUT DIAGNOSIS OF HYPERTENSION: ICD-10-CM
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
## 2025-01-18 LAB
ALBUMIN SERPL BCG-MCNC: 4.3 G/DL (ref 3.5–5.2)
ALP SERPL-CCNC: 91 U/L (ref 40–150)
ALT SERPL W P-5'-P-CCNC: 9 U/L (ref 0–70)
ANION GAP SERPL CALCULATED.3IONS-SCNC: 10 MMOL/L (ref 7–15)
AST SERPL W P-5'-P-CCNC: 16 U/L (ref 0–45)
BASOPHILS # BLD AUTO: 0 10E3/UL (ref 0–0.2)
BASOPHILS NFR BLD AUTO: 0 %
BILIRUB SERPL-MCNC: 0.2 MG/DL
BUN SERPL-MCNC: 13.4 MG/DL (ref 8–23)
CALCIUM SERPL-MCNC: 9.8 MG/DL (ref 8.8–10.4)
CHLORIDE SERPL-SCNC: 106 MMOL/L (ref 98–107)
CREAT SERPL-MCNC: 0.95 MG/DL (ref 0.67–1.17)
EGFRCR SERPLBLD CKD-EPI 2021: >90 ML/MIN/1.73M2
EOSINOPHIL # BLD AUTO: 0.1 10E3/UL (ref 0–0.7)
EOSINOPHIL NFR BLD AUTO: 1 %
ERYTHROCYTE [DISTWIDTH] IN BLOOD BY AUTOMATED COUNT: 12.5 % (ref 10–15)
GLUCOSE SERPL-MCNC: 112 MG/DL (ref 70–99)
HCO3 SERPL-SCNC: 28 MMOL/L (ref 22–29)
HCT VFR BLD AUTO: 42.7 % (ref 40–53)
HGB BLD-MCNC: 13.8 G/DL (ref 13.3–17.7)
HOLD SPECIMEN: NORMAL
IMM GRANULOCYTES # BLD: 0.1 10E3/UL
IMM GRANULOCYTES NFR BLD: 1 %
LYMPHOCYTES # BLD AUTO: 1.4 10E3/UL (ref 0.8–5.3)
LYMPHOCYTES NFR BLD AUTO: 16 %
MCH RBC QN AUTO: 29.9 PG (ref 26.5–33)
MCHC RBC AUTO-ENTMCNC: 32.3 G/DL (ref 31.5–36.5)
MCV RBC AUTO: 93 FL (ref 78–100)
MONOCYTES # BLD AUTO: 1 10E3/UL (ref 0–1.3)
MONOCYTES NFR BLD AUTO: 11 %
NEUTROPHILS # BLD AUTO: 6.2 10E3/UL (ref 1.6–8.3)
NEUTROPHILS NFR BLD AUTO: 71 %
NRBC # BLD AUTO: 0 10E3/UL
NRBC BLD AUTO-RTO: 0 /100
PLATELET # BLD AUTO: 284 10E3/UL (ref 150–450)
POTASSIUM SERPL-SCNC: 4.9 MMOL/L (ref 3.4–5.3)
PROT SERPL-MCNC: 7.6 G/DL (ref 6.4–8.3)
RBC # BLD AUTO: 4.61 10E6/UL (ref 4.4–5.9)
SODIUM SERPL-SCNC: 144 MMOL/L (ref 135–145)
WBC # BLD AUTO: 8.8 10E3/UL (ref 4–11)

## 2025-01-18 PROCEDURE — 82374 ASSAY BLOOD CARBON DIOXIDE: CPT | Performed by: EMERGENCY MEDICINE

## 2025-01-18 PROCEDURE — 250N000013 HC RX MED GY IP 250 OP 250 PS 637: Performed by: EMERGENCY MEDICINE

## 2025-01-18 PROCEDURE — 99285 EMERGENCY DEPT VISIT HI MDM: CPT | Mod: 25 | Performed by: EMERGENCY MEDICINE

## 2025-01-18 PROCEDURE — 70450 CT HEAD/BRAIN W/O DYE: CPT

## 2025-01-18 PROCEDURE — 99285 EMERGENCY DEPT VISIT HI MDM: CPT | Performed by: EMERGENCY MEDICINE

## 2025-01-18 PROCEDURE — 80156 ASSAY CARBAMAZEPINE TOTAL: CPT | Performed by: EMERGENCY MEDICINE

## 2025-01-18 PROCEDURE — 85004 AUTOMATED DIFF WBC COUNT: CPT | Performed by: EMERGENCY MEDICINE

## 2025-01-18 PROCEDURE — 93010 ELECTROCARDIOGRAM REPORT: CPT | Performed by: EMERGENCY MEDICINE

## 2025-01-18 PROCEDURE — 82040 ASSAY OF SERUM ALBUMIN: CPT | Performed by: EMERGENCY MEDICINE

## 2025-01-18 PROCEDURE — 80185 ASSAY OF PHENYTOIN TOTAL: CPT | Performed by: EMERGENCY MEDICINE

## 2025-01-18 PROCEDURE — 71101 X-RAY EXAM UNILAT RIBS/CHEST: CPT | Mod: LT

## 2025-01-18 PROCEDURE — 250N000011 HC RX IP 250 OP 636: Performed by: EMERGENCY MEDICINE

## 2025-01-18 PROCEDURE — 70496 CT ANGIOGRAPHY HEAD: CPT

## 2025-01-18 PROCEDURE — 36415 COLL VENOUS BLD VENIPUNCTURE: CPT | Performed by: EMERGENCY MEDICINE

## 2025-01-18 PROCEDURE — 93005 ELECTROCARDIOGRAM TRACING: CPT | Performed by: EMERGENCY MEDICINE

## 2025-01-18 PROCEDURE — 250N000009 HC RX 250: Performed by: EMERGENCY MEDICINE

## 2025-01-18 RX ORDER — IOPAMIDOL 755 MG/ML
100 INJECTION, SOLUTION INTRAVASCULAR ONCE
Status: COMPLETED | OUTPATIENT
Start: 2025-01-18 | End: 2025-01-18

## 2025-01-18 RX ORDER — MECLIZINE HYDROCHLORIDE 25 MG/1
25 TABLET ORAL ONCE
Status: COMPLETED | OUTPATIENT
Start: 2025-01-18 | End: 2025-01-18

## 2025-01-18 RX ADMIN — IOPAMIDOL 67 ML: 755 INJECTION, SOLUTION INTRAVENOUS at 21:32

## 2025-01-18 RX ADMIN — MECLIZINE HYDROCHLORIDE 25 MG: 25 TABLET ORAL at 20:02

## 2025-01-18 RX ADMIN — SODIUM CHLORIDE 80 ML: 9 INJECTION, SOLUTION INTRAVENOUS at 21:32

## 2025-01-18 ASSESSMENT — ACTIVITIES OF DAILY LIVING (ADL)
ADLS_ACUITY_SCORE: 41

## 2025-01-19 ENCOUNTER — APPOINTMENT (OUTPATIENT)
Dept: MRI IMAGING | Facility: CLINIC | Age: 60
End: 2025-01-19
Attending: EMERGENCY MEDICINE
Payer: MEDICARE

## 2025-01-19 VITALS
HEART RATE: 88 BPM | TEMPERATURE: 98.8 F | OXYGEN SATURATION: 98 % | RESPIRATION RATE: 20 BRPM | SYSTOLIC BLOOD PRESSURE: 177 MMHG | DIASTOLIC BLOOD PRESSURE: 93 MMHG

## 2025-01-19 LAB
ATRIAL RATE - MUSE: 95 BPM
CARBAMAZEPINE SERPL-MCNC: 6.7 UG/ML (ref 4–12)
DIASTOLIC BLOOD PRESSURE - MUSE: NORMAL MMHG
INTERPRETATION ECG - MUSE: NORMAL
P AXIS - MUSE: 66 DEGREES
PHENYTOIN SERPL-MCNC: 18.2 UG/ML
PR INTERVAL - MUSE: 154 MS
QRS DURATION - MUSE: 78 MS
QT - MUSE: 304 MS
QTC - MUSE: 382 MS
R AXIS - MUSE: 38 DEGREES
SYSTOLIC BLOOD PRESSURE - MUSE: NORMAL MMHG
T AXIS - MUSE: 184 DEGREES
VENTRICULAR RATE- MUSE: 95 BPM

## 2025-01-19 PROCEDURE — 70553 MRI BRAIN STEM W/O & W/DYE: CPT

## 2025-01-19 PROCEDURE — 70544 MR ANGIOGRAPHY HEAD W/O DYE: CPT | Mod: XS

## 2025-01-19 PROCEDURE — A9585 GADOBUTROL INJECTION: HCPCS | Performed by: EMERGENCY MEDICINE

## 2025-01-19 PROCEDURE — 70549 MR ANGIOGRAPH NECK W/O&W/DYE: CPT

## 2025-01-19 PROCEDURE — 255N000002 HC RX 255 OP 636: Performed by: EMERGENCY MEDICINE

## 2025-01-19 RX ORDER — MECLIZINE HCL 12.5 MG 12.5 MG/1
25 TABLET ORAL 4 TIMES DAILY PRN
Qty: 30 TABLET | Refills: 0 | Status: SHIPPED | OUTPATIENT
Start: 2025-01-19

## 2025-01-19 RX ORDER — GADOBUTROL 604.72 MG/ML
0.1 INJECTION INTRAVENOUS ONCE
Status: COMPLETED | OUTPATIENT
Start: 2025-01-19 | End: 2025-01-19

## 2025-01-19 RX ADMIN — GADOBUTROL 8.58 ML: 604.72 INJECTION INTRAVENOUS at 01:08

## 2025-01-19 ASSESSMENT — ACTIVITIES OF DAILY LIVING (ADL)
ADLS_ACUITY_SCORE: 41

## 2025-01-19 NOTE — INTERIM SUMMARY
MRI brain neg for any acute ischemic stroke        No further stroke work up needed, we will sign off  Gen Neuro velasquez Fischer  Stroke Fellow

## 2025-01-19 NOTE — ED PROVIDER NOTES
ED Provider Note  [facility]      History     Chief Complaint   Patient presents with    Dizziness     HPI  Hernandez Melara is a 59 year old male who presents with dizziness and falls.  This started after dental extraction on 2025.  He notes 2 falls and struck his head, has abrasions to his forehead.  He notes feeling of room spinning.  He also notes feeling like he is seeing things.  No previous similar occurrences in the past.  He also notes some left lateral rib pain after one of the falls.  No other symptoms noted.    Past Medical History  No past medical history on file.  No past surgical history on file.  carBAMazepine (TEGRETOL XR) 200 MG 12 hr tablet  DILANTIN 100 MG capsule  DILANTIN 100 MG CR capsule  DILANTIN 30 MG CR capsule  atorvastatin (LIPITOR) 20 MG tablet  Calcium Carbonate-Vitamin D (CALCIUM + D PO)  DILANTIN 30 MG CR capsule  Multiple Vitamin (MULTIVITAMINS PO)  rosuvastatin (CRESTOR) 10 MG tablet  Simvastatin (ZOCOR PO)      Allergies   Allergen Reactions    Aspirin Itching     Family History  No family history on file.  Social History   Social History     Tobacco Use    Smoking status: Former     Current packs/day: 0.00     Types: Cigarettes     Quit date: 2008     Years since quittin.5    Smokeless tobacco: Never   Substance Use Topics    Alcohol use: Never      Past medical history, past surgical history, medications, allergies, family history, and social history were reviewed with the patient. No additional pertinent items.   A medically appropriate review of systems was performed with pertinent positives and negatives noted in the HPI, and all other systems negative.    Physical Exam   BP: (!) 164/91  Pulse: 87  Temp: 99.7  F (37.6  C)  Resp: 20  SpO2: 98 %  Physical Exam  Vitals and nursing note reviewed.   Constitutional:       General: He is not in acute distress.     Appearance: He is well-developed. He is not diaphoretic.   HENT:      Head:  Normocephalic and atraumatic.        Mouth/Throat:      Pharynx: No oropharyngeal exudate.   Eyes:      General: No scleral icterus.        Right eye: No discharge.         Left eye: No discharge.      Pupils: Pupils are equal, round, and reactive to light.   Cardiovascular:      Rate and Rhythm: Normal rate and regular rhythm.      Heart sounds: Normal heart sounds. No murmur heard.     No friction rub. No gallop.   Pulmonary:      Effort: Pulmonary effort is normal. No respiratory distress.      Breath sounds: Normal breath sounds. No wheezing.   Chest:      Chest wall: No tenderness.   Abdominal:      General: Bowel sounds are normal. There is no distension.      Palpations: Abdomen is soft.      Tenderness: There is no abdominal tenderness.   Musculoskeletal:         General: No tenderness or deformity. Normal range of motion.      Cervical back: Normal range of motion and neck supple.   Skin:     General: Skin is warm and dry.      Coloration: Skin is not pale.      Findings: No erythema or rash.   Neurological:      Mental Status: He is alert and oriented to person, place, and time.      Cranial Nerves: No cranial nerve deficit.           ED Course, Procedures, & Data      Procedures            EKG Interpretation:      Interpreted by Mik Yoon DO  Time reviewed: 1914  Symptoms at time of EKG: dizziness   Rhythm: normal sinus   Rate: 95  Axis: Normal  Ectopy: none  Conduction: normal  ST Segments/ T Waves: No ST-T wave changes and No acute ischemic changes  Q Waves: none  Comparison to prior: No old EKG available    Clinical Impression: no acute abnormalities                 Results for orders placed or performed during the hospital encounter of 01/18/25   Extra Tube     Status: None (In process)    Narrative    The following orders were created for panel order Extra Tube.  Procedure                               Abnormality         Status                     ---------                                -----------         ------                     Extra Red Top Tube[[id number]]                               In process                   Please view results for these tests on the individual orders.   CBC with platelets differential     Status: None (In process)    Narrative    The following orders were created for panel order CBC with platelets differential.  Procedure                               Abnormality         Status                     ---------                               -----------         ------                     CBC with platelets and d...[[id number]]                      In process                   Please view results for these tests on the individual orders.     Medications   meclizine (ANTIVERT) tablet 25 mg (25 mg Oral $Given 1/18/25 2002)     Labs Ordered and Resulted from Time of ED Arrival to Time of ED Departure - No data to display  CT Head w/o Contrast    (Results Pending)   CTA Head Neck with Contrast    (Results Pending)              Assessment & Plan    This 59-year-old male who presents with 3 days of vertigo as well as multiple falls result.  This occurred after dental appointment.  He sent to follows and he struck his head during both those resulting in abrasion to his forehead.  He also notes left rib pain.  He is also notes vision changes, states he feels like he is seeing things.  Exam demonstrates no other acute abnormalities.  Lab work shows no acute abnormality.  Phenytoin and carbamazepine levels are pending at this time.  ECG shows no acute abnormalities.  CT of head and CTA show possible age-indeterminate cerebellar stroke.  I discussed case with Stroke Neurology recommends MRI.  MRI and MRA have been ordered and are pending at this time.  Patient was given meclizine which gave him improvement in symptoms.  Patient was signed out to incoming physician pending results of MRI.    I have reviewed the nursing notes. I have reviewed the findings, diagnosis, plan and need for  follow up with the patient.    New Prescriptions    No medications on file       Final diagnoses:   None       Mik Yoon DO  [facility] EMERGENCY DEPARTMENT  1/18/2025     Mik Yoon DO  01/19/25 0002

## 2025-01-19 NOTE — ED TRIAGE NOTES
Pt states he had tooth extraction on thursday. Pt states that felt dizzy after the surgery, fell and hit his head while still in the dental office. Pt states he is been having on and off dizziness, and vision changes since Thursdays. Pt reports dizziness got worse today he felt the room was spinning.       Pt adds he is unsure if he passed out

## 2025-01-19 NOTE — ED NOTES
"  [facility]    Stroke Telephone Note    I was called by Mik Yoon on 01/18/25 regarding patient Hernandez Melara. The patient is a 59 year old male with epilepsy presents with vertigo and visual hallucinations since Thursday. CT head with R MCA territory encephalomalacia and R cerebellar age indeterminate infarct.     Vitals  BP: (!) 163/116   Pulse: 87   Resp: 20   Temp: 99.2  F (37.3  C)        Imaging Findings  CT head   IMPRESSION:  1.  Right cerebellum small age-indeterminate infarct.      2.  Otherwise, no acute intracranial process identified.     3.  Chronic intracranial changes described above.    HEAD CTA:   1.  Significantly limited exam. Consider MRA brain.     2.  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     3.  Multiple intracranial arterial stenoses and occlusions described above likely due to atherosclerotic vascular disease. Please see above for discussion. Some of these findings are likely accentuated by artifact or artifactual.     NECK CTA:  1.  Limited exam. Consider MRA neck.     2.  No definitive significant stenosis, occlusion, dissection.    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 They are not intended to replace the clinical judgment of his in-person providers. I was not requested to personally see or examine the patient at this time.     The Stroke Staff is Dr. Medrano.    Santos Fischer MD  Vascular Neurology Fellow    To page me or covering stroke neurology team member, click here: [email or web address]  Choose \"On Call\" tab at top, then select \"NEUROLOGY/ALL SITES\" from middle drop-down box, press Enter, then look for \"stroke\" or \"telestroke\" for your site.    "

## 2025-01-19 NOTE — DISCHARGE INSTRUCTIONS
Please follow-up with your primary care provider in the next 3 to 5 days for further evaluation and follow-up.  Please call to schedule appointment.  Please also follow-up with your neurologist.  Please call Tuesday to schedule an appointment.  Rest, drink plenty of fluids.  Please take meclizine every 6 hours as needed for dizziness.  Please return to the emergency department if you develop any worsening symptoms.

## 2025-01-19 NOTE — ED PROVIDER NOTES
Emergency Department Patient Sign-out       Brief HPI:  This is a 59 year old male signed out to me by Dr. Yoon .  See initial ED Provider note for details of the presentation.       Significant Events prior to my assuming care:-year-old male who presents with dizziness, falls.  CT demonstrated age-indeterminate cerebellar stroke.  Patient is pending MRI and MRA.  Patient did have meclizine which improved his symptoms.      Exam:   Patient Vitals for the past 24 hrs:   BP Temp Temp src Pulse Resp SpO2   01/19/25 0400 (!) 165/95 -- -- -- -- 96 %   01/19/25 0300 (!) 173/93 -- -- -- -- 99 %   01/19/25 0245 -- -- -- -- -- 99 %   01/19/25 0230 (!) 169/97 -- -- -- -- 98 %   01/19/25 0215 -- -- -- -- -- 99 %   01/19/25 0200 (!) 181/89 -- -- -- -- 98 %   01/19/25 0145 -- -- -- -- -- 98 %   01/19/25 0130 (!) 165/97 -- -- -- -- 96 %   01/19/25 0000 (!) 175/106 -- -- -- -- 95 %   01/18/25 2330 (!) 164/91 -- -- -- -- --   01/18/25 2300 (!) 173/87 -- -- -- -- 99 %   01/18/25 2259 -- 98.8  F (37.1  C) Oral -- -- --   01/18/25 2245 -- -- -- -- -- 98 %   01/18/25 2230 -- -- -- -- -- 99 %   01/18/25 2215 -- -- -- -- -- 99 %   01/18/25 2200 -- -- -- -- -- 99 %   01/18/25 2145 (!) 163/116 99.2  F (37.3  C) Oral -- -- --   01/18/25 1859 (!) 164/91 99.7  F (37.6  C) Oral 87 20 98 %           ED RESULTS:   Results for orders placed or performed during the hospital encounter of 01/18/25 (from the past 24 hours)   CBC with platelets differential     Status: None    Collection Time: 01/18/25  8:08 PM    Narrative    The following orders were created for panel order CBC with platelets differential.  Procedure                               Abnormality         Status                     ---------                               -----------         ------                     CBC with platelets and d...[[id number]]                      Final result                 Please view results for these tests on the individual orders.   Comprehensive  metabolic panel     Status: Abnormal    Collection Time: 01/18/25  8:08 PM   Result Value Ref Range    Sodium 144 135 - 145 mmol/L    Potassium 4.9 3.4 - 5.3 mmol/L    Carbon Dioxide (CO2) 28 22 - 29 mmol/L    Anion Gap 10 7 - 15 mmol/L    Urea Nitrogen 13.4 8.0 - 23.0 mg/dL    Creatinine 0.95 0.67 - 1.17 mg/dL    GFR Estimate >90 >60 mL/min/1.73m2    Calcium 9.8 8.8 - 10.4 mg/dL    Chloride 106 98 - 107 mmol/L    Glucose 112 (H) 70 - 99 mg/dL    Alkaline Phosphatase 91 40 - 150 U/L    AST 16 0 - 45 U/L    ALT 9 0 - 70 U/L    Protein Total 7.6 6.4 - 8.3 g/dL    Albumin 4.3 3.5 - 5.2 g/dL    Bilirubin Total 0.2 <=1.2 mg/dL   Carbamazepine total     Status: Normal    Collection Time: 01/18/25  8:08 PM   Result Value Ref Range    Carbamazepine 6.7 4.0 - 12.0 ug/mL   Phenytoin level     Status: Normal    Collection Time: 01/18/25  8:08 PM   Result Value Ref Range    Phenytoin 18.2   ug/mL   CBC with platelets and differential     Status: None    Collection Time: 01/18/25  8:08 PM   Result Value Ref Range    WBC Count 8.8 4.0 - 11.0 10e3/uL    RBC Count 4.61 4.40 - 5.90 10e6/uL    Hemoglobin 13.8 13.3 - 17.7 g/dL    Hematocrit 42.7 40.0 - 53.0 %    MCV 93 78 - 100 fL    MCH 29.9 26.5 - 33.0 pg    MCHC 32.3 31.5 - 36.5 g/dL    RDW 12.5 10.0 - 15.0 %    Platelet Count 284 150 - 450 10e3/uL    % Neutrophils 71 %    % Lymphocytes 16 %    % Monocytes 11 %    % Eosinophils 1 %    % Basophils 0 %    % Immature Granulocytes 1 %    NRBCs per 100 WBC 0 <1 /100    Absolute Neutrophils 6.2 1.6 - 8.3 10e3/uL    Absolute Lymphocytes 1.4 0.8 - 5.3 10e3/uL    Absolute Monocytes 1.0 0.0 - 1.3 10e3/uL    Absolute Eosinophils 0.1 0.0 - 0.7 10e3/uL    Absolute Basophils 0.0 0.0 - 0.2 10e3/uL    Absolute Immature Granulocytes 0.1 <=0.4 10e3/uL    Absolute NRBCs 0.0 10e3/uL   Extra Tube     Status: None    Collection Time: 01/18/25  8:08 PM    Narrative    The following orders were created for panel order Extra Tube.  Procedure                                Abnormality         Status                     ---------                               -----------         ------                     Extra Red Top Tube[[id number]]                               Final result                 Please view results for these tests on the individual orders.   Extra Red Top Tube     Status: None    Collection Time: 01/18/25  8:08 PM   Result Value Ref Range    Hold Specimen JI    CT Head w/o Contrast     Status: None    Collection Time: 01/18/25  9:33 PM    Narrative    EXAM: CT HEAD W/O CONTRAST  LOCATION: [facility]  DATE: 1/18/2025    INDICATION: Vertigo x 2 days, 2 falls with head strike.  COMPARISON: None.  TECHNIQUE: Routine CT Head without IV contrast. Multiplanar reformats. Dose reduction techniques were used.    FINDINGS:  INTRACRANIAL CONTENTS: No intracranial hemorrhage, extraaxial collection, or mass effect.  Right cerebellum small age-indeterminate infarct. Mild to moderate presumed chronic small vessel ischemic changes. Mild to moderate generalized volume loss. No   hydrocephalus. Right MCA territory large chronic infarct. Right brainstem wallerian degeneration.    VISUALIZED ORBITS/SINUSES/MASTOIDS: No intraorbital abnormality. Mild mucosal thickening scattered about the paranasal sinuses. No middle ear or mastoid effusion.    BONES/SOFT TISSUES: No acute abnormality. Are scribed dental disease      Impression    IMPRESSION:  1.  Right cerebellum small age-indeterminate infarct.     2.  Otherwise, no acute intracranial process identified.    3.  Chronic intracranial changes described above.   CTA Head Neck with Contrast     Status: None    Collection Time: 01/18/25  9:33 PM    Addendum: 1/18/2025    ADDENDUM:  Radiology assistant Bhavani Gaxiola confirmed Dr Yoon has received the radiology report at 10:55 PM 1/18/2025.      In addition, there is a typographical error.    Right mid M1 occluded extending distally.  Paucity of right distal MCA arteries (not V4 segments). Findings likely chronic given large right MCA territory chronic infarct.    END ADDENDUM      Narrative    EXAM: CTA HEAD NECK W CONTRAST  LOCATION: [facility]  DATE: 1/18/2025    INDICATION: Vertigo after dental procedure 3 days ago.  COMPARISON: None.  CONTRAST: 67 mL Isovue 370  TECHNIQUE: Head and neck CT angiogram with IV contrast. Axial helical CT images of the head and neck vessels obtained during the arterial phase of intravenous contrast administration. Axial 2D reconstructed images and multiplanar 3D MIP reconstructed   images of the head and neck vessels were performed by the technologist. Dose reduction techniques were used. All stenosis measurements made according to NASCET criteria unless otherwise specified.    FINDINGS:   Streak artifact limits assessment. Venous contamination limits assessment. Portions of the arterial system demonstrate poor enhancement also limiting assessment.    HEAD CTA:  Bilateral carotid siphons demonstrate stenoses secondary to calcified plaque difficult to quantify given motion artifact.    Right mid M1 occluded extending distally. Paucity of right V4 segments. Findings likely chronic given large right MCA territory chronic infarct.    Left proximal M2 inferior division focal severe stenosis. More distal M2 segment branches demonstrate multiple severe stenoses versus artifact.    Left distal A1 focal severe stenosis versus artifact. A2/A3 segments demonstrate multifocal stenoses ranging from mild to severe versus artifact.    Bilateral PCAs demonstrate multiple stenoses ranging from mild to severe versus artifact.    Left P1 the left proximal P2 possible short segment near occlusions/occlusions.    Mid to inferior basilar artery mild-to-moderate stenosis versus artifact.    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    Otherwise, no brain aneurysm. No  AVM/AVF.      NECK CTA:  RIGHT CAROTID:   No significant stenosis/occlusion. No dissection.    LEFT CAROTID:   No significant stenosis/occlusion. No dissection.    VERTEBRAL ARTERIES:   No definite hemodynamically significant stenosis, occlusion, dissection on limited evaluation given overlying artifact.      AORTIC ARCH: Classic aortic arch anatomy. No significant stenosis at the origin of the great vessels.    ARTERIAL PLAQUE: Calcified/noncalcified plaque noted throughout portions of the arterial system.    NONVASCULAR STRUCTURES:   T4 vertebral body mild to moderate compression deformity. Multilevel spondylosis. Multiple small and prominent lymph nodes within the neck. Multiple missing teeth with residual tooth socket suggesting dental extraction.         Impression     IMPRESSION:   HEAD CTA:   1.  Significantly limited exam. Consider MRA brain.    2.  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    3.  Multiple intracranial arterial stenoses and occlusions described above likely due to atherosclerotic vascular disease. Please see above for discussion. Some of these findings are likely accentuated by artifact or artifactual.    NECK CTA:  1.  Limited exam. Consider MRA neck.    2.  No definitive significant stenosis, occlusion, dissection.   Ribs XR, unilat 3 views + PA chest,  left     Status: None    Collection Time: 01/18/25  9:44 PM    Narrative    EXAM: XR RIBS AND CHEST LEFT 3 VIEWS  LOCATION: [facility]  DATE: 1/18/2025    INDICATION: Fall, L rib pain  COMPARISON: None.      Impression    IMPRESSION: No consolidation. No pleural effusions or pneumothorax. Normal cardiac size. Residual contrast in the upper tracts. No acute displaced fractures in the visualized ribs.   MRA Neck (Carotids) w/o & w Contrast     Status: None    Collection Time: 01/19/25  1:08 AM    Narrative    EXAM: MR BRAIN W/O AND W CONTRAST, MRA BRAIN ([place] [facility])  W/O CONTRAST, MRA NECK (CAROTIDS) W/O AND W CONTRAST  LOCATION: [facility]  DATE: 1/19/2025    INDICATION: Vertigo, possible cerebellar CVA on CT.  COMPARISON: 1/18/2025.  CONTRAST: 8.5 ml kamini.  TECHNIQUE:   1) Routine multiplanar multisequence head MRI without and with intravenous contrast.  2) 3D time-of-flight head MRA without intravenous contrast.  3) Neck MRA without and with IV contrast. Stenosis measurements made according to NASCET criteria unless otherwise specified.    FINDINGS:  HEAD MRI:  INTRACRANIAL CONTENTS: No acute or subacute infarct. No mass, acute hemorrhage, or extra-axial fluid collections. Extensive chronic encephalomalacia and gliosis throughout the majority of the right MCA territory, with ex vacuo dilatation of the right   lateral ventricle. Chronic infarct versus volume loss in the right cerebellum. Mild to moderate sequelae of chronic microvascular ischemic disease. Mild to moderate generalized cerebral atrophy. No hydrocephalus. Normal position of the cerebellar   tonsils. Smooth mild pachymeningeal enhancement.    SELLA: No abnormality accounting for technique.    OSSEOUS STRUCTURES/SOFT TISSUES: Normal marrow signal. The major intracranial vascular flow voids are maintained.     ORBITS: No abnormality accounting for technique.     SINUSES/MASTOIDS: Mild mucosal thickening scattered about the paranasal sinuses. No middle ear or mastoid effusion.     HEAD MRA:   ANTERIOR CIRCULATION: Mild irregularity of the internal carotid arteries consistent with atherosclerosis. Scattered mild to moderate stenoses in the bilateral [place] and left MCA branches, consistent with intracranial atherosclerosis. Overall paucity of   vascularity in the right MCA territory, related to the chronic right MCA infarct. No proximal arterial occlusion. No saccular aneurysm or high flow vascular malformation. Standard [place] anatomy.    POSTERIOR CIRCULATION: No  proximal occlusion. No convincing saccular aneurysm. Prominence of the basilar artery tip on the prior exam is favored to reflect a confluence of vessels. Mild narrowing of the distal left vertebral artery just proximal to the   basilar artery. Mild to moderate stenoses of the PCA branches, including the bilateral P1 segments. Balanced vertebral arteries supply a normal basilar artery.     NECK MRA:   RIGHT CAROTID: No measurable stenosis or dissection.    LEFT CAROTID: No measurable stenosis or dissection.    VERTEBRAL ARTERIES: Limited evaluation of proximal vertebral arteries. No flow-limiting stenosis in the V2 and V3 segments. Balanced vertebral arteries.    AORTIC ARCH: Suboptimally visualized on this exam.      Impression    IMPRESSION:  HEAD MRI:   1.  No acute intracranial process.  2.  Nonspecific, smooth pachymeningeal enhancement, which may be related to underlying volume loss and hyperostosis, although findings can also be seen with intracranial hypotension, recent lumbar puncture, and meningitis in the correct clinical setting.  3.  Additional chronic changes, as described.    HEAD MRA:   1.  Intracranial atherosclerosis, without evidence of a large vessel occlusion.  2.  No convincing saccular aneurysm.  3.  Paucity of vascularity in the right MCA territory, likely related to the large chronic right MCA territory infarct.    NECK MRA:  1.  No flow-limiting stenosis of the cervical arterial vasculature.   MRA Brain ([place]) wo Contrast     Status: None    Collection Time: 01/19/25  1:08 AM    Narrative    EXAM: MR BRAIN W/O AND W CONTRAST, MRA BRAIN ([place]) W/O CONTRAST, MRA NECK (CAROTIDS) W/O AND W CONTRAST  LOCATION: [facility]  DATE: 1/19/2025    INDICATION: Vertigo, possible cerebellar CVA on CT.  COMPARISON: 1/18/2025.  CONTRAST: 8.5 ml kamini.  TECHNIQUE:   1) Routine multiplanar multisequence head MRI without and with intravenous  contrast.  2) 3D time-of-flight head MRA without intravenous contrast.  3) Neck MRA without and with IV contrast. Stenosis measurements made according to NASCET criteria unless otherwise specified.    FINDINGS:  HEAD MRI:  INTRACRANIAL CONTENTS: No acute or subacute infarct. No mass, acute hemorrhage, or extra-axial fluid collections. Extensive chronic encephalomalacia and gliosis throughout the majority of the right MCA territory, with ex vacuo dilatation of the right   lateral ventricle. Chronic infarct versus volume loss in the right cerebellum. Mild to moderate sequelae of chronic microvascular ischemic disease. Mild to moderate generalized cerebral atrophy. No hydrocephalus. Normal position of the cerebellar   tonsils. Smooth mild pachymeningeal enhancement.    SELLA: No abnormality accounting for technique.    OSSEOUS STRUCTURES/SOFT TISSUES: Normal marrow signal. The major intracranial vascular flow voids are maintained.     ORBITS: No abnormality accounting for technique.     SINUSES/MASTOIDS: Mild mucosal thickening scattered about the paranasal sinuses. No middle ear or mastoid effusion.     HEAD MRA:   ANTERIOR CIRCULATION: Mild irregularity of the internal carotid arteries consistent with atherosclerosis. Scattered mild to moderate stenoses in the bilateral [place] and left MCA branches, consistent with intracranial atherosclerosis. Overall paucity of   vascularity in the right MCA territory, related to the chronic right MCA infarct. No proximal arterial occlusion. No saccular aneurysm or high flow vascular malformation. Standard [place] anatomy.    POSTERIOR CIRCULATION: No proximal occlusion. No convincing saccular aneurysm. Prominence of the basilar artery tip on the prior exam is favored to reflect a confluence of vessels. Mild narrowing of the distal left vertebral artery just proximal to the   basilar artery. Mild to moderate stenoses of the PCA branches, including the bilateral P1 segments.  Balanced vertebral arteries supply a normal basilar artery.     NECK MRA:   RIGHT CAROTID: No measurable stenosis or dissection.    LEFT CAROTID: No measurable stenosis or dissection.    VERTEBRAL ARTERIES: Limited evaluation of proximal vertebral arteries. No flow-limiting stenosis in the V2 and V3 segments. Balanced vertebral arteries.    AORTIC ARCH: Suboptimally visualized on this exam.      Impression    IMPRESSION:  HEAD MRI:   1.  No acute intracranial process.  2.  Nonspecific, smooth pachymeningeal enhancement, which may be related to underlying volume loss and hyperostosis, although findings can also be seen with intracranial hypotension, recent lumbar puncture, and meningitis in the correct clinical setting.  3.  Additional chronic changes, as described.    HEAD MRA:   1.  Intracranial atherosclerosis, without evidence of a large vessel occlusion.  2.  No convincing saccular aneurysm.  3.  Paucity of vascularity in the right MCA territory, likely related to the large chronic right MCA territory infarct.    NECK MRA:  1.  No flow-limiting stenosis of the cervical arterial vasculature.   MR Brain w/o & w Contrast     Status: None    Collection Time: 01/19/25  1:09 AM    Narrative    EXAM: MR BRAIN W/O AND W CONTRAST, MRA BRAIN ([place] [facility]) W/O CONTRAST, MRA NECK (CAROTIDS) W/O AND W CONTRAST  LOCATION: [facility]  DATE: 1/19/2025    INDICATION: Vertigo, possible cerebellar CVA on CT.  COMPARISON: 1/18/2025.  CONTRAST: 8.5 ml kamini.  TECHNIQUE:   1) Routine multiplanar multisequence head MRI without and with intravenous contrast.  2) 3D time-of-flight head MRA without intravenous contrast.  3) Neck MRA without and with IV contrast. Stenosis measurements made according to NASCET criteria unless otherwise specified.    FINDINGS:  HEAD MRI:  INTRACRANIAL CONTENTS: No acute or subacute infarct. No mass, acute hemorrhage, or extra-axial fluid collections.  Extensive chronic encephalomalacia and gliosis throughout the majority of the right MCA territory, with ex vacuo dilatation of the right   lateral ventricle. Chronic infarct versus volume loss in the right cerebellum. Mild to moderate sequelae of chronic microvascular ischemic disease. Mild to moderate generalized cerebral atrophy. No hydrocephalus. Normal position of the cerebellar   tonsils. Smooth mild pachymeningeal enhancement.    SELLA: No abnormality accounting for technique.    OSSEOUS STRUCTURES/SOFT TISSUES: Normal marrow signal. The major intracranial vascular flow voids are maintained.     ORBITS: No abnormality accounting for technique.     SINUSES/MASTOIDS: Mild mucosal thickening scattered about the paranasal sinuses. No middle ear or mastoid effusion.     HEAD MRA:   ANTERIOR CIRCULATION: Mild irregularity of the internal carotid arteries consistent with atherosclerosis. Scattered mild to moderate stenoses in the bilateral [place] and left MCA branches, consistent with intracranial atherosclerosis. Overall paucity of   vascularity in the right MCA territory, related to the chronic right MCA infarct. No proximal arterial occlusion. No saccular aneurysm or high flow vascular malformation. Standard [place] anatomy.    POSTERIOR CIRCULATION: No proximal occlusion. No convincing saccular aneurysm. Prominence of the basilar artery tip on the prior exam is favored to reflect a confluence of vessels. Mild narrowing of the distal left vertebral artery just proximal to the   basilar artery. Mild to moderate stenoses of the PCA branches, including the bilateral P1 segments. Balanced vertebral arteries supply a normal basilar artery.     NECK MRA:   RIGHT CAROTID: No measurable stenosis or dissection.    LEFT CAROTID: No measurable stenosis or dissection.    VERTEBRAL ARTERIES: Limited evaluation of proximal vertebral arteries. No flow-limiting stenosis in the V2 and V3 segments. Balanced vertebral  arteries.    AORTIC ARCH: Suboptimally visualized on this exam.      Impression    IMPRESSION:  HEAD MRI:   1.  No acute intracranial process.  2.  Nonspecific, smooth pachymeningeal enhancement, which may be related to underlying volume loss and hyperostosis, although findings can also be seen with intracranial hypotension, recent lumbar puncture, and meningitis in the correct clinical setting.  3.  Additional chronic changes, as described.    HEAD MRA:   1.  Intracranial atherosclerosis, without evidence of a large vessel occlusion.  2.  No convincing saccular aneurysm.  3.  Paucity of vascularity in the right MCA territory, likely related to the large chronic right MCA territory infarct.    NECK MRA:  1.  No flow-limiting stenosis of the cervical arterial vasculature.       ED MEDICATIONS:   Medications   meclizine (ANTIVERT) tablet 25 mg (25 mg Oral $Given 1/18/25 2002)   iopamidol (ISOVUE-370) solution 100 mL (67 mLs Intravenous $Given 1/18/25 2132)   sodium chloride 0.9 % bag 100mL for CT scan flush use (80 mLs Intravenous $Given 1/18/25 2132)   gadobutrol (GADAVIST) injection 8.58 mL (8.58 mLs Intravenous $Given 1/19/25 0108)         Impression:  No diagnosis found.    Plan:    Pending studies include MRI, Neurology recommendations.    I personally reviewed and interpreted MRI of the brain which demonstrates no acute intracranial process.  There is nonspecific smooth patchy meningeal enhancement.  No evidence of large vessel occlusion no flow-limiting stenosis.    I discussed patient management with neurology/stroke team who reviewed MRI, no acute stroke or findings.  No emergent indication for admission at this time.  Recommend close outpatient follow-up.     On reevaluation resting comfortably, no distress.  Patient reports significant improvement of symptoms after meclizine given earlier in the evening.  Patient was able to ambulate with no difficulty, denies any weakness, dizziness.  Patient feels  comfortable with discharge home.  Patient is agreeable to close outpatient follow-up with his primary care provider and neurology team.  Patient to call discussion appointment.  I did discuss with patient regarding his blood pressure being elevated however per chart review patient has had elevated blood pressures on prior visits the past few years (dating back to 2022).  Patient denies any history of hypertension.  I did discuss with the patient and recommend him following up with his primary care provider for continued blood pressure monitoring and likely getting started on medications.  Patient agrees with the plan.  Strict return precautions discussed.          MD Jalen Ceja Linsey Gail, MD  01/19/25 [id number]